# Patient Record
Sex: MALE | Race: WHITE | NOT HISPANIC OR LATINO | URBAN - NONMETROPOLITAN AREA
[De-identification: names, ages, dates, MRNs, and addresses within clinical notes are randomized per-mention and may not be internally consistent; named-entity substitution may affect disease eponyms.]

---

## 2019-12-12 ENCOUNTER — IMPORTED ENCOUNTER (OUTPATIENT)
Dept: URBAN - NONMETROPOLITAN AREA CLINIC 1 | Facility: CLINIC | Age: 54
End: 2019-12-12

## 2019-12-12 PROCEDURE — 92015 DETERMINE REFRACTIVE STATE: CPT

## 2019-12-12 PROCEDURE — 92014 COMPRE OPH EXAM EST PT 1/>: CPT

## 2019-12-12 NOTE — PATIENT DISCUSSION
Compound Hyperopic Astigmatism OU w/Presbyopia-  discussed findings w/patient-  new spectacle Rx issued-  continue to monitor yearly or prns/p BRVO w/Macular Edema OD -  discussed findings w/patient-  patient previously followed by Dr. Suraj Seay but d/t billing issue patient chose to change providers- Patient is now being followed by Dr. Francisco Ch at [facility] in [facility]-  will get notes from [address] [place] [address] for review -  patient will continue current treatment-  RTC here yearly or as per Dr. Haily Nelson w/ PCO -  discussed findings w/patient-  intraocular lens is stable and in place -  mild PCO noted OD no treatment indicated -  continue to monitor yearly or prnCataract OS -  discussed diagnosis in detail with patient-  recommend UV protection -  no treatment indicated at this time - continue to monitor yearly or prn; 's Notes: MR 12/12/2019DFE 12/12/2019

## 2019-12-15 PROBLEM — Z98.41: Noted: 2019-12-15

## 2019-12-15 PROBLEM — H52.03: Noted: 2019-12-15

## 2019-12-15 PROBLEM — H52.223: Noted: 2019-12-15

## 2019-12-15 PROBLEM — H25.13: Noted: 2019-12-15

## 2019-12-15 PROBLEM — H52.4: Noted: 2019-12-15

## 2021-06-10 ENCOUNTER — IMPORTED ENCOUNTER (OUTPATIENT)
Dept: URBAN - NONMETROPOLITAN AREA CLINIC 1 | Facility: CLINIC | Age: 56
End: 2021-06-10

## 2021-06-10 PROCEDURE — 92014 COMPRE OPH EXAM EST PT 1/>: CPT

## 2021-06-10 PROCEDURE — 92015 DETERMINE REFRACTIVE STATE: CPT

## 2021-06-10 NOTE — PATIENT DISCUSSION
Astigmatism / Hyperopia / Presbyopia OU - Discussed diagnosis in detail with patient- New [employer] RX given today- Continue to monitor s/p BRVO w/Macular Edema OD - Discussed diagnosis in detail with patient - Patient is now being followed by Dr. Jayna James at [facility] in [facility] and gets injections in OD every 4-6 weeks last injection was 4 weeks ago.  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

## 2021-06-16 PROBLEM — H52.4: Noted: 2021-06-16

## 2021-06-16 PROBLEM — H26.491: Noted: 2021-06-16

## 2021-06-16 PROBLEM — H25.812: Noted: 2021-06-16

## 2021-06-16 PROBLEM — Z96.1: Noted: 2021-06-16

## 2022-04-09 ASSESSMENT — VISUAL ACUITY
OU_SC: 20/20
OS_SC: 20/25+
OD_SC: 20/50-2
OU_CC: J2
OD_SC: 20/60
OS_SC: 20/20

## 2022-04-09 ASSESSMENT — TONOMETRY
OS_IOP_MMHG: 14
OD_IOP_MMHG: 11
OD_IOP_MMHG: 14
OS_IOP_MMHG: 12

## 2022-06-14 ENCOUNTER — ESTABLISHED PATIENT (OUTPATIENT)
Dept: URBAN - NONMETROPOLITAN AREA CLINIC 1 | Facility: CLINIC | Age: 57
End: 2022-06-14

## 2022-06-14 DIAGNOSIS — H52.4: ICD-10-CM

## 2022-06-14 PROCEDURE — 92015 DETERMINE REFRACTIVE STATE: CPT

## 2022-06-14 PROCEDURE — 92014 COMPRE OPH EXAM EST PT 1/>: CPT

## 2022-06-14 ASSESSMENT — TONOMETRY
OD_IOP_MMHG: 12
OS_IOP_MMHG: 12

## 2022-06-14 ASSESSMENT — VISUAL ACUITY
OD_CC: 20/60-2
OS_CC: 20/20

## 2023-06-15 ENCOUNTER — ESTABLISHED PATIENT (OUTPATIENT)
Dept: URBAN - NONMETROPOLITAN AREA CLINIC 1 | Facility: CLINIC | Age: 58
End: 2023-06-15

## 2023-06-15 DIAGNOSIS — H34.8310: ICD-10-CM

## 2023-06-15 DIAGNOSIS — H52.4: ICD-10-CM

## 2023-06-15 DIAGNOSIS — H25.812: ICD-10-CM

## 2023-06-15 PROCEDURE — 92014 COMPRE OPH EXAM EST PT 1/>: CPT

## 2023-06-15 PROCEDURE — 92015 DETERMINE REFRACTIVE STATE: CPT

## 2023-06-15 ASSESSMENT — VISUAL ACUITY
OS_CC: 20/20
OD_PH: 20/60
OD_CC: 20/80-1
OU_CC: 20/20

## 2023-06-15 ASSESSMENT — TONOMETRY
OD_IOP_MMHG: 11
OS_IOP_MMHG: 11

## 2024-06-18 ENCOUNTER — ESTABLISHED PATIENT (OUTPATIENT)
Dept: URBAN - NONMETROPOLITAN AREA CLINIC 1 | Facility: CLINIC | Age: 59
End: 2024-06-18

## 2024-06-18 DIAGNOSIS — H52.4: ICD-10-CM

## 2024-06-18 PROCEDURE — 92015 DETERMINE REFRACTIVE STATE: CPT

## 2024-06-18 PROCEDURE — 92014 COMPRE OPH EXAM EST PT 1/>: CPT

## 2024-06-18 ASSESSMENT — VISUAL ACUITY
OU_CC: 20/20
OS_CC: 20/25-1
OD_CC: 20/40-1

## 2024-06-18 ASSESSMENT — TONOMETRY
OS_IOP_MMHG: 9
OD_IOP_MMHG: 8

## 2025-06-20 ENCOUNTER — COMPREHENSIVE EXAM (OUTPATIENT)
Age: 60
End: 2025-06-20

## 2025-06-20 DIAGNOSIS — H52.4: ICD-10-CM

## 2025-06-20 PROCEDURE — 92015 DETERMINE REFRACTIVE STATE: CPT

## 2025-06-20 PROCEDURE — 92014 COMPRE OPH EXAM EST PT 1/>: CPT
